# Patient Record
Sex: MALE | URBAN - METROPOLITAN AREA
[De-identification: names, ages, dates, MRNs, and addresses within clinical notes are randomized per-mention and may not be internally consistent; named-entity substitution may affect disease eponyms.]

---

## 2023-02-09 ENCOUNTER — APPOINTMENT (RX ONLY)
Dept: URBAN - METROPOLITAN AREA CLINIC 47 | Facility: CLINIC | Age: 43
Setting detail: DERMATOLOGY
End: 2023-02-09

## 2023-02-09 DIAGNOSIS — L74.51 PRIMARY FOCAL HYPERHIDROSIS: ICD-10-CM

## 2023-02-09 DIAGNOSIS — Z71.89 OTHER SPECIFIED COUNSELING: ICD-10-CM

## 2023-02-09 DIAGNOSIS — D22 MELANOCYTIC NEVI: ICD-10-CM

## 2023-02-09 DIAGNOSIS — L81.4 OTHER MELANIN HYPERPIGMENTATION: ICD-10-CM

## 2023-02-09 PROBLEM — D48.5 NEOPLASM OF UNCERTAIN BEHAVIOR OF SKIN: Status: ACTIVE | Noted: 2023-02-09

## 2023-02-09 PROBLEM — L74.519 PRIMARY FOCAL HYPERHIDROSIS, UNSPECIFIED: Status: ACTIVE | Noted: 2023-02-09

## 2023-02-09 PROBLEM — D22.9 MELANOCYTIC NEVI, UNSPECIFIED: Status: ACTIVE | Noted: 2023-02-09

## 2023-02-09 PROCEDURE — ? SHAVE REMOVAL

## 2023-02-09 PROCEDURE — ? COUNSELING

## 2023-02-09 PROCEDURE — 99203 OFFICE O/P NEW LOW 30 MIN: CPT | Mod: 25

## 2023-02-09 PROCEDURE — 11301 SHAVE SKIN LESION 0.6-1.0 CM: CPT

## 2023-02-09 PROCEDURE — ? PRESCRIPTION

## 2023-02-09 PROCEDURE — ? SUNSCREEN RECOMMENDATIONS

## 2023-02-09 RX ORDER — GLYCOPYRRONIUM 2.4 G/100G
CLOTH TOPICAL QD
Qty: 30 | Refills: 2 | Status: ERX

## 2023-02-09 RX ORDER — GLYCOPYRRONIUM 2.4 G/100G
CLOTH TOPICAL QD
Qty: 30 | Refills: 2 | Status: ERX | COMMUNITY
Start: 2023-02-09

## 2023-02-09 RX ADMIN — GLYCOPYRRONIUM: 2.4 CLOTH TOPICAL at 00:00

## 2023-02-09 ASSESSMENT — LOCATION ZONE DERM
LOCATION ZONE: ARM
LOCATION ZONE: TRUNK

## 2023-02-09 ASSESSMENT — LOCATION SIMPLE DESCRIPTION DERM
LOCATION SIMPLE: LEFT UPPER ARM
LOCATION SIMPLE: LEFT UPPER BACK

## 2023-02-09 ASSESSMENT — LOCATION DETAILED DESCRIPTION DERM
LOCATION DETAILED: LEFT INFERIOR UPPER BACK
LOCATION DETAILED: LEFT ANTERIOR PROXIMAL UPPER ARM

## 2023-02-09 NOTE — PROCEDURE: COUNSELING
Detail Level: Zone
Detail Level: Detailed
Medical Necessity Information: LCD Guidelines vary from payer to payer. Please check with your payer's policy to determine medical necessity.
Medical Necessity Clause: Botulinum toxin hyperhidrosis therapy is medically necessary because
Detail Level: Simple

## 2023-02-09 NOTE — PROCEDURE: SHAVE REMOVAL
Medical Necessity Information: It is in your best interest to select a reason for this procedure from the list below. All of these items fulfill various CMS LCD requirements except the new and changing color options.
Medical Necessity Clause: This procedure was medically necessary because the lesion that was treated was: itchy
Lab: [id number] [facility]
Lab Facility: [address] [place] [address]
Body Location Override (Optional - Billing Will Still Be Based On Selected Body Map Location If Applicable): left lower pv
Detail Level: Detailed
Was A Bandage Applied: Yes
Size Of Lesion In Cm (Required): 0.6
X Size Of Lesion In Cm (Optional): 0
Depth Of Shave: dermis
Biopsy Method: Dermablade
Anesthesia Type: 1% lidocaine without epinephrine
Anesthesia Volume In Cc: 0.5
Hemostasis: Electrocautery
Wound Care: Bacitracin
Path Notes (To The Dermatopathologist): Size: 0.6
Render Path Notes In Note?: No
Consent was obtained from the patient. The risks and benefits to therapy were discussed in detail. Specifically, the risks of infection, scarring, bleeding, prolonged wound healing, incomplete removal, allergy to anesthesia, nerve injury and recurrence were addressed. Prior to the procedure, the treatment site was clearly identified and confirmed by the patient. All components of Universal Protocol/PAUSE Rule completed.
Post-Care Instructions: I reviewed with the patient in detail post-care instructions. Patient is to keep the biopsy site dry overnight, and then apply bacitracin twice daily until healed. Patient may apply hydrogen peroxide soaks to remove any crusting.
Notification Instructions: Patient will be notified of pathology results. However, patient instructed to call the office if not contacted within 2 weeks.
Billing Type: [employer]

## 2024-01-18 NOTE — HPI: UPPER BODY SKIN CHECK
How Severe Are Your Spot(S)?: mild
The transseptal needle was removed intact. 
What Is The Reason For Today's Visit?: Upper Body Skin Exam
[Follow-Up: _____] : a [unfilled] follow-up visit